# Patient Record
Sex: FEMALE | Race: WHITE
[De-identification: names, ages, dates, MRNs, and addresses within clinical notes are randomized per-mention and may not be internally consistent; named-entity substitution may affect disease eponyms.]

---

## 2017-05-19 ENCOUNTER — HOSPITAL ENCOUNTER (OUTPATIENT)
Dept: HOSPITAL 47 - RADCTMAIN | Age: 82
End: 2017-05-19
Payer: MEDICARE

## 2017-05-19 ENCOUNTER — HOSPITAL ENCOUNTER (OUTPATIENT)
Dept: HOSPITAL 47 - PROCWHC3 | Age: 82
Discharge: HOME | End: 2017-05-19
Payer: MEDICARE

## 2017-05-19 VITALS
TEMPERATURE: 97.7 F | HEART RATE: 78 BPM | SYSTOLIC BLOOD PRESSURE: 116 MMHG | RESPIRATION RATE: 16 BRPM | DIASTOLIC BLOOD PRESSURE: 59 MMHG

## 2017-05-19 DIAGNOSIS — R41.3: ICD-10-CM

## 2017-05-19 DIAGNOSIS — R51: Primary | ICD-10-CM

## 2017-05-19 DIAGNOSIS — M81.0: Primary | ICD-10-CM

## 2017-05-19 PROCEDURE — 96372 THER/PROPH/DIAG INJ SC/IM: CPT

## 2017-05-19 PROCEDURE — 72131 CT LUMBAR SPINE W/O DYE: CPT

## 2017-05-19 NOTE — CT
EXAMINATION TYPE: CT lumbar spine wo con

 

DATE OF EXAM: 5/19/2017 9:03 AM

 

COMPARISON: NONE

 

HISTORY: 88-year-old female with pain, evaluate for spinal stenosis of lumbar spine

 

TECHNIQUE: Contiguous axial scanning of the lumbar spine without IV contrast. Coronal and sagittal re
constructions performed.

 

CT DLP: 329.6 mGycm

Automated exposure control for dose reduction was used.

 

 

FINDINGS: 

There is bilateral nonobstructive renal calculi measuring up to 4 mm on the left. Left greater than r
ight parapelvic cysts are demonstrated.

 

There is osteopenia. Mild compression deformity of T12 is age indeterminate but suspected chronic. No
 retropulsion into the spinal canal.

 

Alignment is maintained.

 

Mild disc bulging at multiple levels. There is facet arthropathy lower lumbar spine with right-sided 
L5 pars interarticularis defect but no anterolisthesis.

 

By CT, no large focal disc herniation is seen. Disc bulges contribute to variable mild bilateral neur
oforaminal stenoses especially from L3 through S1 levels.

 

 

 

 

IMPRESSION: 

 

1. MILD COMPRESSION DEFORMITY OF T12 IS AGE INDETERMINATE BUT SUSPECTED TO BE CHRONIC. CORRELATE FOR 
ANY FOCAL PAIN AT THIS LEVEL. NO RETROPULSION INTO THE SPINAL CANAL.

2. MILD MULTILEVEL DEGENERATIVE DISC DISEASE. MILD FACET ARTHROPATHY LOWER LUMBAR SPINE. CHANGES RESU
LT IN VARIABLE MILD NEUROFORAMINAL STENOSES FROM L3 THROUGH S1 LEVELS.

3. BY CT, NO LARGE FOCAL DISC HERNIATION OR CANAL COMPROMISE IS APPRECIATED.

4. RIGHT L5 PARS DEFECT . PRESERVED ALIGNMENT.

5. BILATERAL NONOBSTRUCTIVE NEPHROLITHIASIS.

## 2017-06-08 ENCOUNTER — HOSPITAL ENCOUNTER (OUTPATIENT)
Dept: HOSPITAL 47 - RADUSWWP | Age: 82
Discharge: HOME | End: 2017-06-08
Payer: MEDICARE

## 2017-06-08 DIAGNOSIS — I73.9: Primary | ICD-10-CM

## 2017-06-08 PROCEDURE — 93922 UPR/L XTREMITY ART 2 LEVELS: CPT

## 2017-06-14 NOTE — P.ARTDOP
Arterial Doppler





LOWER EXTREMITY ARTERIAL DOPPLER:





DATE OF SERVICE: 06/08/2017





Reason for study: Bilateral foot pain





Doppler waveforms: Multiphasic bilaterally throughout.





Pulse volume recording: [].





Pressure gradients: None.





Ankle-brachial indices: Greater than 1 bilaterally.





Toe pressures: [] on the right, [] on the left





Impression: Normal limited study.

## 2017-07-27 ENCOUNTER — HOSPITAL ENCOUNTER (OUTPATIENT)
Dept: HOSPITAL 47 - LABWHC1 | Age: 82
Discharge: HOME | End: 2017-07-27
Payer: MEDICARE

## 2017-07-27 DIAGNOSIS — G45.9: Primary | ICD-10-CM

## 2017-07-27 LAB
BUN SERPL-SCNC: 34 MG/DL (ref 7–17)
NON-AFRICAN AMERICAN GFR(MDRD): 44

## 2017-07-27 PROCEDURE — 82565 ASSAY OF CREATININE: CPT

## 2017-07-27 PROCEDURE — 36415 COLL VENOUS BLD VENIPUNCTURE: CPT

## 2017-07-27 PROCEDURE — 84520 ASSAY OF UREA NITROGEN: CPT

## 2017-07-31 ENCOUNTER — HOSPITAL ENCOUNTER (OUTPATIENT)
Dept: HOSPITAL 47 - RADMRIMAIN | Age: 82
Discharge: HOME | End: 2017-07-31
Payer: MEDICARE

## 2017-07-31 DIAGNOSIS — D49.6: ICD-10-CM

## 2017-07-31 DIAGNOSIS — G31.9: Primary | ICD-10-CM

## 2017-07-31 DIAGNOSIS — I67.82: ICD-10-CM

## 2017-07-31 PROCEDURE — 70553 MRI BRAIN STEM W/O & W/DYE: CPT

## 2017-07-31 NOTE — MR
EXAMINATION TYPE: MR brain wo/w con

 

DATE OF EXAM: 7/31/2017

 

COMPARISON: CT brain August 2, 2016

 

HISTORY: Leg stiffness per patient, TIA and tumor per order.

 

TECHNIQUE: 

Multiplanar, multisequence images of the brain and brainstem is performed without and with IV contras
t, utilizing 9 mL intravenous MultiHance .

 

FINDINGS: Diffusion weighted images demonstrate no evidence of a recent infarct or other diffusion ab
normality.  There is no worrisome extra-axial fluid collection. There is ventricular and sulcal promi
nence consistent with diffuse cerebral atrophy. There are focal and confluent areas of T2 hyperintens
ity seen throughout the deep and periventricular white matter. Lesions are nonspecific in appearance 
and distribution but most likely on basis of product of chronic small vessel ischemic change in patie
nt of this age.

 

Midline structures demonstrate normal morphology.  The craniocervical junction appears within normal 
limits.  Post contrast images demonstrate no abnormal enhancement. The dural venous sinuses appear pa
tent. Mucosal thickening in both sphenoid sinuses with dependent fluid right sphenoid sinus remains p
resent 

IMPRESSION: 

1. There is moderate diffuse cerebral atrophy and chronic small vessel ischemic change redemonstrated
.

2. Possible recurrent acute right-sided sphenoid sinusitis, clinical correlation advised.

3. No evidence of a recent infarct. No suspicious enhancing intraparenchymal mass is noted.

## 2017-08-09 ENCOUNTER — HOSPITAL ENCOUNTER (OUTPATIENT)
Dept: HOSPITAL 47 - RADMAMWWP | Age: 82
Discharge: HOME | End: 2017-08-09
Payer: MEDICARE

## 2017-08-09 DIAGNOSIS — Z12.31: Primary | ICD-10-CM

## 2017-08-09 PROCEDURE — 77063 BREAST TOMOSYNTHESIS BI: CPT

## 2017-08-10 NOTE — MM
Reason for exam: screening  (asymptomatic).

Last mammogram was performed 2 years and 2 months ago.



History:

Patient is postmenopausal.



Physical Findings:

A clinical breast exam by your physician is recommended on an annual basis and 

results should be correlated with mammographic findings.



MG 3D Screening Mammo W/Cad

Bilateral CC and MLO view(s) were taken.

Prior study comparison: May 26, 2015, bilateral MG screening mammo w CAD.  May 23,

2014, bilateral MG screening mammo w CAD.

The breast tissue is heterogeneously dense. This may lower the sensitivity of 

mammography.  No significant changes when compared with prior studies.





ASSESSMENT: Negative, BI-RAD 1



RECOMMENDATION:

Routine screening mammogram of both breasts in 1 year.

## 2017-08-21 ENCOUNTER — HOSPITAL ENCOUNTER (OUTPATIENT)
Dept: HOSPITAL 47 - LABWHC1 | Age: 82
Discharge: HOME | End: 2017-08-21
Payer: MEDICARE

## 2017-08-21 DIAGNOSIS — M54.16: Primary | ICD-10-CM

## 2017-08-21 LAB
BUN SERPL-SCNC: 22 MG/DL (ref 7–17)
NON-AFRICAN AMERICAN GFR(MDRD): 48

## 2017-08-21 PROCEDURE — 84520 ASSAY OF UREA NITROGEN: CPT

## 2017-08-21 PROCEDURE — 82565 ASSAY OF CREATININE: CPT

## 2017-08-21 PROCEDURE — 36415 COLL VENOUS BLD VENIPUNCTURE: CPT

## 2017-08-26 ENCOUNTER — HOSPITAL ENCOUNTER (OUTPATIENT)
Dept: HOSPITAL 47 - RADMRIMAIN | Age: 82
Discharge: HOME | End: 2017-08-26
Payer: MEDICARE

## 2017-08-26 DIAGNOSIS — M46.06: ICD-10-CM

## 2017-08-26 DIAGNOSIS — M51.16: Primary | ICD-10-CM

## 2017-08-26 DIAGNOSIS — M47.26: ICD-10-CM

## 2017-08-26 PROCEDURE — 72158 MRI LUMBAR SPINE W/O & W/DYE: CPT

## 2017-08-26 NOTE — MR
EXAMINATION TYPE: MR lumbar spine wo/w con

 

DATE OF EXAM: 8/26/2017

 

COMPARISON: NONE

 

HISTORY: CT scan dated 5/19/2017

 

Contrast: 5 mL of MultiHance

 

TECHNIQUE: T1 and T2  axial and sagittal images of the lumbar spine are submitted.  

 

FINDINGS: There is no abnormal signal seen within the visualized spinal cord or paraspinal soft tissu
es. There are bilateral simple appearing renal cysts and nonobstructive calculi.

 

At T12-L1 there is a moderate chronic compression superior endplate fracture T12. Approximately 5-10%
 retropulsion and mild effacement of thecal sac but no spinal cord contact or foraminal encroachment.


 

At L1-2 there is mild degenerative disc disease. No Canal stenosis. No foraminal encroachment.

 

At L2-3 there is mild degenerative disc disease and mild facet arthropathy and ligamentum flavum hype
rtrophy but no canal stenosis or foraminal encroachment.

 

At L3-4 there is mild degenerative disc disease with facet arthropathy and ligamentum flavum hypertro
phy. No disc herniation, canal stenosis. Neural foramina remain patent.

 

At L4-5 there is moderate facet arthropathy and ligamentum flavum hypertrophy. There is broad-based c
entral left paracentral disc bulging with mild effacement of thecal sac. No Canal stenosis. Mild bila
teral foraminal encroachment.

 

At L5-S1 there is facet arthropathy with unilateral right spondylolysis but no spondylolisthesis. No 
canal stenosis or disc herniation. Neural foramina patent.

 

 

IMPRESSION:

1. Multilevel mild degenerative disc disease with a chronic appearing superior endplate compression f
racture T12. This results in 5-10% retropulsion and mild effacement of thecal sac but no spinal cord 
contact.

2. Multilevel facet arthropathy and hypertrophic change of the ligamentum flavum but no canal stenosi
s or focal disc herniations.

3. Stable unilateral spondylolysis L5.

4. Disc bulging L4-L5 with no canal stenosis. Mild bilateral foraminal encroachment.

## 2017-11-20 ENCOUNTER — HOSPITAL ENCOUNTER (OUTPATIENT)
Dept: HOSPITAL 47 - PROCWHC3 | Age: 82
End: 2017-11-20
Payer: MEDICARE

## 2017-11-20 VITALS — TEMPERATURE: 98 F | RESPIRATION RATE: 16 BRPM | DIASTOLIC BLOOD PRESSURE: 61 MMHG | SYSTOLIC BLOOD PRESSURE: 134 MMHG

## 2017-11-20 DIAGNOSIS — M81.0: Primary | ICD-10-CM

## 2017-11-20 PROCEDURE — 96372 THER/PROPH/DIAG INJ SC/IM: CPT

## 2018-05-03 ENCOUNTER — HOSPITAL ENCOUNTER (EMERGENCY)
Dept: HOSPITAL 47 - EC | Age: 83
Discharge: HOME | End: 2018-05-03
Payer: MEDICARE

## 2018-05-03 VITALS — SYSTOLIC BLOOD PRESSURE: 92 MMHG | RESPIRATION RATE: 16 BRPM | DIASTOLIC BLOOD PRESSURE: 56 MMHG | HEART RATE: 74 BPM

## 2018-05-03 VITALS — TEMPERATURE: 98.2 F

## 2018-05-03 DIAGNOSIS — Z79.82: ICD-10-CM

## 2018-05-03 DIAGNOSIS — Z23: ICD-10-CM
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
## 2018-05-03 DIAGNOSIS — Z79.899: ICD-10-CM

## 2018-05-03 DIAGNOSIS — E07.9: ICD-10-CM

## 2018-05-03 DIAGNOSIS — S01.01XA: Primary | ICD-10-CM

## 2018-05-03 DIAGNOSIS — Z88.5: ICD-10-CM

## 2018-05-03 PROCEDURE — 12002 RPR S/N/AX/GEN/TRNK2.6-7.5CM: CPT

## 2018-05-03 PROCEDURE — 99283 EMERGENCY DEPT VISIT LOW MDM: CPT

## 2018-05-03 PROCEDURE — 90471 IMMUNIZATION ADMIN: CPT

## 2018-05-03 PROCEDURE — 72125 CT NECK SPINE W/O DYE: CPT

## 2018-05-03 PROCEDURE — 90715 TDAP VACCINE 7 YRS/> IM: CPT

## 2018-05-03 PROCEDURE — 70450 CT HEAD/BRAIN W/O DYE: CPT

## 2018-05-03 NOTE — CT
EXAMINATION TYPE: CT brain eladio wo con

 

DATE OF EXAM: 5/3/2018

 

COMPARISON: 8/2/2016

 

HISTORY: Fall, head lac

 

CT DLP: 1405 mGycm

Automated exposure control for dose reduction was used.

 

TECHNIQUE: CT scan of the head and cervical spine are performed without contrast.

 

FINDINGS:   Soft tissue hematoma overlying the left frontal bone. Mild to moderate generalized degene
rative change seen. Low-attenuation within the white matter noted bilaterally. No midline shift or ma
ss effect. Changes of chronic sinusitis noted.

 

No acute intracranial hemorrhage. Question previous trauma to the right zygomatic arch.

 

Assessment spinal canal nondiagnostic due to artifact and lack of contrast. There is diffuse osteopen
ia with multilevel degenerative disc disease and facet arthropathy.

No acute fracture.

 

IMPRESSION:

1. There is no acute fracture or dislocation evident in the cervical spine.

2. No acute intracranial hemorrhage, mass effect, or midline shift is seen. Degenerative and nonspeci
fic white matter changes most typical remote microvascular ischemia.

3. Soft tissue hematoma overlying the left frontal bone

## 2018-05-03 NOTE — ED
General Adult HPI





- General


Chief complaint: Wound/Laceration


Stated complaint: fall, head lac


Time Seen by Provider: 05/03/18 12:30


Source: patient, family, RN notes reviewed


Mode of arrival: wheelchair


Limitations: no limitations





- History of Present Illness


Initial comments: 





89-year-old female presents status post fall with head injury.  Patient fell 

earlier this morning striking the back of her head.  She did have significant 

amount of bleeding and she is not on blood thinner she does take a baby 

aspirin.  According to the patient there was no loss of consciousness.  She 

denies significant pain.  She denies neck pain.  Denies extremity pain.  Denies 

chest pain or shortness of breath.  No abdominal pain.  Patient has had 

multiple falls over the past several years.  She has had 2 falls within the 

last month.  Patient is accompanied by her niece who is concerned about the 

number of falls she's had.  She has been evaluated by her primary care 

physician who was well aware of her falling.  She does have a cane but not use 

it all the time, no walker.  Patient lives alone.





- Related Data


 Home Medications











 Medication  Instructions  Recorded  Confirmed


 


Calcium Carbonate/Vitamin D3 1 tab PO DAILY 05/19/17 05/03/18





[Calcium 600-Vit D3 400 Caplet]   


 


Levothyroxine Sodium [Synthroid] 25 mcg PO Q48H 05/19/17 05/03/18


 


Aspirin [Adult Low Dose Aspirin EC] 81 mg PO DAILY 11/20/17 05/03/18


 


Cetirizine HCl [Zyrtec] 10 mg PO DAILY 05/03/18 05/03/18











 Allergies











Allergy/AdvReac Type Severity Reaction Status Date / Time


 


codeine Allergy  Nausea & Verified 05/03/18 12:38





   Vomiting  














Review of Systems


ROS Statement: 


Those systems with pertinent positive or pertinent negative responses have been 

documented in the HPI.





ROS Other: All systems not noted in ROS Statement are negative.





Past Medical History


Past Medical History: Thyroid Disorder


History of Any Multi-Drug Resistant Organisms: None Reported


Additional Past Surgical History / Comment(s): c/s


Past Psychological History: No Psychological Hx Reported


Smoking Status: Never smoker


Past Alcohol Use History: None Reported


Past Drug Use History: None Reported





General Exam


Limitations: no limitations


General appearance: alert, in no apparent distress


Head exam: Present: normocephalic.  Absent: atraumatic (Left parietal occipital 

laceration 3 cm, no active bleeding.)


Eye exam: Present: normal appearance, PERRL


ENT exam: Present: normal exam


Neck exam: Present: normal inspection, full ROM.  Absent: tenderness, 

meningismus


Respiratory exam: Present: normal lung sounds bilaterally.  Absent: respiratory 

distress, wheezes


Cardiovascular Exam: Present: regular rate, normal rhythm


GI/Abdominal exam: Present: soft.  Absent: distended, tenderness


Extremities exam: Present: normal inspection, full ROM, normal capillary 

refill.  Absent: pedal edema


Neurological exam: Present: alert, oriented X3, CN II-XII intact.  Absent: 

motor sensory deficit


Psychiatric exam: Present: normal affect, normal mood


Skin exam: Present: warm, dry.  Absent: cyanosis, diaphoretic





Course


 Vital Signs











  05/03/18





  12:25


 


Temperature 98.2 F


 


Pulse Rate 98


 


Respiratory 20





Rate 


 


Blood Pressure 112/53


 


O2 Sat by Pulse 98





Oximetry 














Procedures





- Laceration


  ** Laceration #1


Consent Obtained: verbal consent


Time Out Performed: Yes


Indication: laceration


Site: scalp


Description: linear


Depth: simple, single layer


Pre-repair: wound explored


Type of Sutures: other (5 staples)


Technique: simple, interrupted


Patient Tolerated Procedure: well





Medical Decision Making





- Medical Decision Making





89-year-old female with fall, head injury, and laceration.  Laceration is 

repaired with 5 staples.  Tetanus is updated.  Patient's neurologic exam is 

nonfocal.  Head CT negative for intracranial hemorrhage, no fracture of the 

cervical spine.  Patient is otherwise well-appearing.  I did have a long 

discussion regarding use of the patient's walker and cane.  She does have good 

outpatient follow-up with her primary care physician and she does have family 

support.





Disposition


Clinical Impression: 


 Laceration, Closed head injury





Disposition: HOME SELF-CARE


Condition: Fair


Instructions:  Laceration (ED), Head Injury (ED)


Is patient prescribed a controlled substance at d/c from ED?: No


Referrals: 


Anjel Rizzo MD [Primary Care Provider] - 1-2 days


Time of Disposition: 13:59

## 2018-05-21 ENCOUNTER — HOSPITAL ENCOUNTER (OUTPATIENT)
Dept: HOSPITAL 47 - PROCWHC3 | Age: 83
Discharge: HOME | End: 2018-05-21
Payer: MEDICARE

## 2018-05-21 VITALS
DIASTOLIC BLOOD PRESSURE: 59 MMHG | RESPIRATION RATE: 14 BRPM | TEMPERATURE: 98.6 F | HEART RATE: 83 BPM | SYSTOLIC BLOOD PRESSURE: 101 MMHG

## 2018-05-21 DIAGNOSIS — M81.0: Primary | ICD-10-CM

## 2018-05-21 PROCEDURE — 96372 THER/PROPH/DIAG INJ SC/IM: CPT

## 2018-05-25 ENCOUNTER — HOSPITAL ENCOUNTER (EMERGENCY)
Dept: HOSPITAL 47 - EC | Age: 83
Discharge: HOME | End: 2018-05-25
Payer: MEDICARE

## 2018-05-25 VITALS — HEART RATE: 85 BPM | DIASTOLIC BLOOD PRESSURE: 70 MMHG | SYSTOLIC BLOOD PRESSURE: 163 MMHG

## 2018-05-25 VITALS — RESPIRATION RATE: 18 BRPM | TEMPERATURE: 98.2 F

## 2018-05-25 DIAGNOSIS — Z79.82: ICD-10-CM

## 2018-05-25 DIAGNOSIS — Z88.5: ICD-10-CM

## 2018-05-25 DIAGNOSIS — S22.069A: ICD-10-CM

## 2018-05-25 DIAGNOSIS — E07.9: ICD-10-CM

## 2018-05-25 DIAGNOSIS — Z79.899: ICD-10-CM

## 2018-05-25 DIAGNOSIS — S22.059A: ICD-10-CM
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
## 2018-05-25 DIAGNOSIS — S22.079A: ICD-10-CM
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
## 2018-05-25 DIAGNOSIS — S22.039A: Primary | ICD-10-CM

## 2018-05-25 LAB
GRAN CASTS UR QL COMP ASSIST: 2 /LPF
HYALINE CASTS UR QL AUTO: 12 /LPF (ref 0–2)
PH UR: 5.5 [PH] (ref 5–8)
PROT UR QL: (no result)
RBC UR QL: 8 /HPF (ref 0–5)
SP GR UR: 1.02 (ref 1–1.03)
SQUAMOUS UR QL AUTO: 3 /HPF (ref 0–4)
UROBILINOGEN UR QL STRIP: 2 MG/DL (ref ?–2)
WBC #/AREA URNS HPF: 2 /HPF (ref 0–5)

## 2018-05-25 PROCEDURE — 72070 X-RAY EXAM THORAC SPINE 2VWS: CPT

## 2018-05-25 PROCEDURE — 99284 EMERGENCY DEPT VISIT MOD MDM: CPT

## 2018-05-25 PROCEDURE — 72100 X-RAY EXAM L-S SPINE 2/3 VWS: CPT

## 2018-05-25 PROCEDURE — 81001 URINALYSIS AUTO W/SCOPE: CPT

## 2018-05-25 NOTE — XR
EXAMINATION TYPE: XR lumbar spine 2 or 3V, XR thoracic spine 2V

 

DATE OF EXAM: 5/25/2018

 

CLINICAL HISTORY: Back pain after a fall

 

TECHNIQUE: Frontal and lateral images of the thoracic and lumbar spine are obtained.

 

COMPARISON: MRI lumbar spine dated 8/26/2017

 

FINDINGS:  There are 5 lumbar type vertebral bodies identified.  The lumbar spine shows satisfactory 
alignment without evidence of acute fracture or dislocation. Compression deformity of the T12 vertebr
al body measures approximately 5%. The vertebral body heights and disk space heights of the lumbar sp
ine are within normal limits.   The oblique images appear within normal limits. There is a 5 mm left 
renal calculus and 2 mm right renal calculus.

 

In addition to the compression deformity of the T12 vertebral body (chronic as seen on the prior MR l
umbar spine of 8/26/2017) within the thoracic spine there are compression deformities of the T10 vert
ebral body of less than 10%, T7 vertebral body of less than 10%, T6 vertebral body of approximately 5
0%, T3 vertebral body of approximately 40%, and mild multilevel degenerative changes of the thoracic 
spine.

 

Calcified mediastinal granulomas are incidentally seen. There is diffuse osseous demineralization. Mi
ld degenerative changes of the lumbar spine are seen. Moderate calcific atheromatous changes are note
d of the abdominal aorta.

 

IMPRESSION:

1. Age indeterminant compression deformities of the T3, T6, T7 and T10 vertebral bodies without prior
 comparison. Correlate with point tenderness to determine the need for MRI to evaluate for bone marro
w edema.

2. Chronic compression deformity of the T12 vertebral body seen on the prior MR lumbar spine of 8/26/
2017.

3. Bilateral probable renal calculi.

## 2018-05-25 NOTE — ED
Fall HPI





- General


Chief Complaint: Fall


Stated Complaint: fall


Time Seen by Provider: 18 13:12


Source: patient, family, RN notes reviewed, old records reviewed


Mode of arrival: wheelchair





- History of Present Illness


Initial Comments: 





89-year-old female presents emergency department today after a fall.  Patient 

was ambulating with her walker stating and a counter.  She reports that she 

lost her balance and fell backward onto her back.  Patient states that she has 

been evaluated by multiple doctors including spine specialist in neurology in 

regards to her leg pains and chronic dizziness.  She states that she has no 

pain associated with the fall.  She states she feels well and really does not 

want to be here.  She wanted her family members wanted her to be evaluated.  

She doesn't complain of any chest pain shortness of breath.  No recent nausea 

or vomiting.  No other symptoms.





- Related Data


 Home Medications











 Medication  Instructions  Recorded  Confirmed


 


Calcium Carbonate/Vitamin D3 1 tab PO DAILY 17





[Calcium 600-Vit D3 400 Caplet]   


 


Levothyroxine Sodium [Synthroid] 25 mcg PO Q48H 17


 


Aspirin [Adult Low Dose Aspirin EC] 81 mg PO DAILY 17


 


Cetirizine HCl [Zyrtec] 10 mg PO DAILY 18











 Allergies











Allergy/AdvReac Type Severity Reaction Status Date / Time


 


codeine AdvReac  Nausea & Verified 18 13:19





   Vomiting  














Review of Systems


ROS Statement: 


Those systems with pertinent positive or pertinent negative responses have been 

documented in the HPI.





ROS Other: All systems not noted in ROS Statement are negative.





Past Medical History


Past Medical History: Thyroid Disorder


Additional Past Medical History / Comment(s): weak on legs


History of Any Multi-Drug Resistant Organisms: None Reported


Past Surgical History:  Section


Additional Past Surgical History / Comment(s): c/s


Past Psychological History: No Psychological Hx Reported


Smoking Status: Never smoker


Past Alcohol Use History: None Reported


Past Drug Use History: None Reported





General Exam





- General Exam Comments


Initial Comments: 





89-year-old female.  Alert and oriented.  No acute distress.


Limitations: no limitations


General appearance: alert, in no apparent distress


Head exam: Present: atraumatic, normocephalic, normal inspection


Eye exam: Present: normal appearance, PERRL, EOMI.  Absent: scleral icterus, 

conjunctival injection, periorbital swelling


ENT exam: Present: normal exam, mucous membranes moist


Neck exam: Present: normal inspection.  Absent: tenderness, meningismus, 

lymphadenopathy


Respiratory exam: Present: normal lung sounds bilaterally.  Absent: respiratory 

distress, wheezes, rales, rhonchi, stridor


Cardiovascular Exam: Present: regular rate, normal rhythm, normal heart sounds.

  Absent: systolic murmur, diastolic murmur, rubs, gallop, clicks


GI/Abdominal exam: Present: soft, normal bowel sounds.  Absent: distended, 

tenderness, guarding, rebound, rigid


Extremities exam: Present: normal inspection, full ROM, normal capillary 

refill.  Absent: tenderness, pedal edema, joint swelling, calf tenderness


Back exam: Present: normal inspection


Neurological exam: Present: alert, oriented X3, CN II-XII intact


Psychiatric exam: Present: normal affect, normal mood


Skin exam: Present: warm, dry, intact, normal color.  Absent: rash





Course


 Vital Signs











  18





  12:47


 


Temperature 98.2 F


 


Pulse Rate 95


 


Respiratory 18





Rate 


 


Blood Pressure 93/55


 


O2 Sat by Pulse 95





Oximetry 














Medical Decision Making





- Lab Data


 Lab Results











  18 Range/Units





  13:50 


 


Urine Color  Yellow  


 


Urine Appearance  Cloudy H  (Clear)  


 


Urine pH  5.5  (5.0-8.0)  


 


Ur Specific Gravity  1.024  (1.001-1.035)  


 


Urine Protein  1+ H  (Negative)  


 


Urine Glucose (UA)  Negative  (Negative)  


 


Urine Ketones  Negative  (Negative)  


 


Urine Blood  Small H  (Negative)  


 


Urine Nitrite  Negative  (Negative)  


 


Urine Bilirubin  Negative  (Negative)  


 


Urine Urobilinogen  2.0  (<2.0)  mg/dL


 


Ur Leukocyte Esterase  Negative  (Negative)  


 


Urine RBC  8 H  (0-5)  /hpf


 


Urine WBC  2  (0-5)  /hpf


 


Ur Squamous Epith Cells  3  (0-4)  /hpf


 


Hyaline Casts  12 H  (0-2)  /lpf


 


Granular Casts  2  (0)  /lpf


 


Urine Mucus  Occasional H  (None)  /hpf














Disposition


Clinical Impression: 


 Fall, Thoracic compression fracture





Disposition: HOME SELF-CARE


Condition: Good


Instructions:  Fall Prevention for Older Adults (ED), Vertebral Compression 

Fracture (ED)


Additional Instructions: 


Patient advised to follow up with PCP and Spine Specialist. Return to ED if any 

alarming signs or symptoms occur. 


Is patient prescribed a controlled substance at d/c from ED?: No


When asked, does pt state using other controlled substances?: No


If prescribed controlled substance>3 days was MAPS reviewed?: No


If opioid is for acute pain is fill amount 7 days or less?: No


If Rx opioid, was Start Talking consent form obtained?: No


Referrals: 


Anjel Rizzo MD [Primary Care Provider] - 1-2 days


Time of Disposition: 15:15